# Patient Record
Sex: MALE | Race: BLACK OR AFRICAN AMERICAN | URBAN - METROPOLITAN AREA
[De-identification: names, ages, dates, MRNs, and addresses within clinical notes are randomized per-mention and may not be internally consistent; named-entity substitution may affect disease eponyms.]

---

## 2017-01-04 ENCOUNTER — HOSPITAL ENCOUNTER (OUTPATIENT)
Dept: BEHAVIORAL HEALTH | Facility: CLINIC | Age: 37
End: 2017-01-04
Attending: SOCIAL WORKER
Payer: COMMERCIAL

## 2017-01-04 PROCEDURE — H2035 A/D TX PROGRAM, PER HOUR: HCPCS | Mod: HQ | Performed by: SOCIAL WORKER

## 2017-01-04 ASSESSMENT — ANXIETY QUESTIONNAIRES
5. BEING SO RESTLESS THAT IT IS HARD TO SIT STILL: NOT AT ALL
7. FEELING AFRAID AS IF SOMETHING AWFUL MIGHT HAPPEN: NOT AT ALL
1. FEELING NERVOUS, ANXIOUS, OR ON EDGE: NOT AT ALL
6. BECOMING EASILY ANNOYED OR IRRITABLE: NOT AT ALL
2. NOT BEING ABLE TO STOP OR CONTROL WORRYING: NOT AT ALL
3. WORRYING TOO MUCH ABOUT DIFFERENT THINGS: NOT AT ALL
IF YOU CHECKED OFF ANY PROBLEMS ON THIS QUESTIONNAIRE, HOW DIFFICULT HAVE THESE PROBLEMS MADE IT FOR YOU TO DO YOUR WORK, TAKE CARE OF THINGS AT HOME, OR GET ALONG WITH OTHER PEOPLE: NOT DIFFICULT AT ALL
GAD7 TOTAL SCORE: 0

## 2017-01-04 ASSESSMENT — PATIENT HEALTH QUESTIONNAIRE - PHQ9: 5. POOR APPETITE OR OVEREATING: NOT AT ALL

## 2017-01-04 NOTE — PROGRESS NOTES
Patient:  Vicente Calles Jr.            Adult CD Progress Note and Treatment Plan Review     Attendance:  Phase II 1 1/2 hours of Phase 2 group therapy,  1/4/2017  For a total of 16 sessions of Phase 1 completed to date, including orientation and    9 session of Phase 2 completed to date.      Please refer to [facility] CD Adult Attendance Record Documentation Flowsheet    Support group attended this week: No    Reporting sobriety:  yes    Treatment Plan     Treatment Plan Review competed on: 1/4/2017     Client preferred learning style: Verbal  Demonstration    Staff Members contributing: Parris Dodge, MSW, LICSW, LADC, Elizabet Damon, LADC, [facility] Intern     Received Supervision: no    Client: contributed to goals and plan.    Client received copy of plan/revised plan: Yes    Client agrees with plan/revised plan: Yes    Changes to Treatment Plan: no    New Goals added since last review no    Goals worked on since last review: dimension 6    Strategies effective: yes    Strategies need these changes: NA    ASAM Risk Rating:    Dimension 1 0 Client reports last use date of cocaine as 6/27/16. No identified concerns currently.     Dimension 2 0  Client reports no concerns currently regarding physical health.    Dimension 3 2  Client reports trying to continue to engage in positive self talk.    Dimension 4 1  Client reports he took off work to attend programming this week.     Dimension 5 2  Client reported staying busy to deal with triggers.     Dimension 6  2 Client was able to reflect on his spirituality and the role that plays in his life.  Client stated he was going to take his mother to the hospital because her knee is injured.    Any changes in Vulnerable Adult Status?  No  If yes, add to treatment plan and individual abuse prevention plan.    Family Involvement:   family refuse family week    Data:   offered feedback client did participate     Client attend 1 1/2 hours of Phase II this week. Client confirmed  sobriety.  He rated his mental health at a 9 out of 10 (10=outstanding). Client participated in the spiritual care lecture and activity. Client was able to reflect on his approach to spirituality and what that means to him. Client was able to identify what role spirituality plays in his life and his sobriety. Client takled about how he is stressed with taking care of his mother who recently injured her knee.     Intervention:   Counselor feedback  Education  Group feedback  Relapse prevention  Twelve Step facilitation  Mental health education    Client completed JOSE DANIEL-7 and PHQ-9, which I entered into EPIC.    Spirituality lecture and activity.    Assessment:   Stages of Change Model  Contemplation    Appears/Sounds:  Cooperative  Engaged    Client appears to be continuing with utilizing positive coping skills that work for him in regards to maintaining sobriety. Client appears apprehensive to engage in new sober leisure activities or attend sober support group meetings.   We talked about being careful to not over do it when taking care of others and to continue practicing good self care.    Plan:  Monitor emotional/physical health   Present an assignment  Call counselor to reschedule individual therapy session  Attend a sober support meeting  Continue to take care of mother, continue with self care.    Parris Dodge, MSW, LICSW, LADC  Elizabet Damon, LADC, [facility] Intern

## 2017-01-05 ASSESSMENT — ANXIETY QUESTIONNAIRES: GAD7 TOTAL SCORE: 0

## 2017-01-05 ASSESSMENT — PATIENT HEALTH QUESTIONNAIRE - PHQ9: SUM OF ALL RESPONSES TO PHQ QUESTIONS 1-9: 0

## 2017-01-17 ENCOUNTER — HOSPITAL ENCOUNTER (OUTPATIENT)
Dept: BEHAVIORAL HEALTH | Facility: CLINIC | Age: 37
End: 2017-01-17
Attending: SOCIAL WORKER
Payer: COMMERCIAL

## 2017-01-17 PROCEDURE — H2035 A/D TX PROGRAM, PER HOUR: HCPCS | Mod: HQ

## 2017-01-17 ASSESSMENT — ANXIETY QUESTIONNAIRES
5. BEING SO RESTLESS THAT IT IS HARD TO SIT STILL: SEVERAL DAYS
2. NOT BEING ABLE TO STOP OR CONTROL WORRYING: NOT AT ALL
1. FEELING NERVOUS, ANXIOUS, OR ON EDGE: SEVERAL DAYS
3. WORRYING TOO MUCH ABOUT DIFFERENT THINGS: NOT AT ALL
IF YOU CHECKED OFF ANY PROBLEMS ON THIS QUESTIONNAIRE, HOW DIFFICULT HAVE THESE PROBLEMS MADE IT FOR YOU TO DO YOUR WORK, TAKE CARE OF THINGS AT HOME, OR GET ALONG WITH OTHER PEOPLE: NOT DIFFICULT AT ALL
6. BECOMING EASILY ANNOYED OR IRRITABLE: SEVERAL DAYS
7. FEELING AFRAID AS IF SOMETHING AWFUL MIGHT HAPPEN: NOT AT ALL
GAD7 TOTAL SCORE: 3

## 2017-01-17 ASSESSMENT — PATIENT HEALTH QUESTIONNAIRE - PHQ9: 5. POOR APPETITE OR OVEREATING: NOT AT ALL

## 2017-01-18 ASSESSMENT — PATIENT HEALTH QUESTIONNAIRE - PHQ9: SUM OF ALL RESPONSES TO PHQ QUESTIONS 1-9: 0

## 2017-01-18 ASSESSMENT — ANXIETY QUESTIONNAIRES: GAD7 TOTAL SCORE: 3

## 2017-01-18 NOTE — PROGRESS NOTES
Patient:  Vicente Calles .            Adult CD Progress Note and Treatment Plan Review     Attendance:  Phase II 1 1/2 hours of Phase 2 group therapy,  1/17/2017  For a total of 16 sessions of Phase 1 completed to date, including orientation and    10 session of Phase 2 completed to date.      Please refer to [facility] CD Adult Attendance Record Documentation Flowsheet    Support group attended this week: No, hasn't for months now.    Reporting sobriety:  yes    Treatment Plan     Treatment Plan Review competed on: 1/18/2017     Client preferred learning style: Verbal  Demonstration    Staff Members contributing: Parris Dodge, MSW, LICSW, LADC     Received Supervision: no    Client: contributed to goals and plan.    Client received copy of plan/revised plan: Yes    Client agrees with plan/revised plan: Yes    Changes to Treatment Plan: no    New Goals added since last review no    Goals worked on since last review: dimension 6 and 5    Strategies effective: yes    Strategies need these changes: NA    ASAM Risk Rating:    Dimension 1 0 Client reports last use date of cocaine as 6/27/16. No concerns currently.     Dimension 2 0  Client reports no concerns currently regarding physical health.    Dimension 3 2  Client stated he was working on managing his anger while at work.    Dimension 4 1  Client brought an assignment partially filled out to present this session.     Dimension 5 2  Client stated his levels of stress are a trigger for relapse.     Dimension 6  2 Client stated his work stresses him out as well as financial stress.     Any changes in Vulnerable Adult Status?  No  If yes, add to treatment plan and individual abuse prevention plan.    Family Involvement:   family refuse family week    Data:   offered feedback client did participate     Client attend 1 1/2 hours of Phase II this week. Client confirmed sobriety.  He rated his mental health an 8 out of 10 (10=outstanding). Client presented his coping  with stress assignment this session. 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 He talked about his mother's injuries and needing to take care of her. He stated a positive regarding his mother staying with him was that she was able to help with childcare, which saves him money. We talked about his lack of attendance at sober support meetings and which barriers are in place for him not going.    Intervention:   Counselor feedback  Education  Group feedback  Relapse prevention  Twelve Step facilitation  Mental health education    Client completed JOSE DANIEL-7 and PHQ-9, which I entered into EPIC.    I asked the client to finish presenting his coping with stress assignment even though it was not fully completed this session.   We discussed his lack of follow through on attending sober support meetings and I asked him to talk about his commitment on future attendance.     Assessment:   Stages of Change Model  Contemplation    Appears/Sounds:  Cooperative  Engaged    Client did a fairly good job presenting his assignment to the group, even though it wasn't fully completed prior to group. He seems more committed now to attending a sober support group and stated he would attend one by next Tuesday.  He stated procrastination is his biggest barrier when it comes to attending a sober support group and needing someone else to force him to go.     Plan:  Monitor emotional/physical health   Present an assignment  Attend a sober support meeting    NAZ Agrawal, [facility], [facility]

## 2021-01-01 ENCOUNTER — RECORDS - HEALTHEAST (OUTPATIENT)
Dept: ADMINISTRATIVE | Facility: CLINIC | Age: 41
End: 2021-01-01